# Patient Record
Sex: MALE | Race: WHITE | URBAN - METROPOLITAN AREA
[De-identification: names, ages, dates, MRNs, and addresses within clinical notes are randomized per-mention and may not be internally consistent; named-entity substitution may affect disease eponyms.]

---

## 2024-07-02 ENCOUNTER — OFFICE VISIT (OUTPATIENT)
Dept: INTERNAL MEDICINE CLINIC | Facility: CLINIC | Age: 39
End: 2024-07-02
Payer: COMMERCIAL

## 2024-07-02 VITALS
DIASTOLIC BLOOD PRESSURE: 88 MMHG | HEIGHT: 73 IN | SYSTOLIC BLOOD PRESSURE: 132 MMHG | OXYGEN SATURATION: 99 % | TEMPERATURE: 98 F | RESPIRATION RATE: 20 BRPM | HEART RATE: 62 BPM | BODY MASS INDEX: 26 KG/M2 | WEIGHT: 196.19 LBS

## 2024-07-02 DIAGNOSIS — Z00.00 ROUTINE GENERAL MEDICAL EXAMINATION AT A HEALTH CARE FACILITY: Primary | ICD-10-CM

## 2024-07-02 DIAGNOSIS — Z13.29 SCREENING FOR THYROID DISORDER: ICD-10-CM

## 2024-07-02 DIAGNOSIS — Z13.220 SCREENING FOR LIPID DISORDERS: ICD-10-CM

## 2024-07-02 DIAGNOSIS — R10.2 SUPRAPUBIC DISCOMFORT: ICD-10-CM

## 2024-07-02 DIAGNOSIS — Z13.0 SCREENING FOR BLOOD DISEASE: ICD-10-CM

## 2024-07-02 DIAGNOSIS — Z13.228 SCREENING FOR METABOLIC DISORDER: ICD-10-CM

## 2024-07-02 PROCEDURE — 99385 PREV VISIT NEW AGE 18-39: CPT | Performed by: INTERNAL MEDICINE

## 2024-07-02 PROCEDURE — 90471 IMMUNIZATION ADMIN: CPT | Performed by: INTERNAL MEDICINE

## 2024-07-02 PROCEDURE — 90715 TDAP VACCINE 7 YRS/> IM: CPT | Performed by: INTERNAL MEDICINE

## 2024-07-02 NOTE — PROGRESS NOTES
Nader Garcia  9/30/1985    Chief Complaint   Patient presents with    [facility]        HPI:   Nader Garcia is a 38 year old male who presents for an annual physical examination.    The patient is proactive with dietary habits and follows a vegan diet. He undergoes an exercise regimen on most mornings.     No chronic medical conditions.    No regular use of prescription or OTC medications.    No acute concerns.    No current outpatient medications on file.      Allergies   Allergen Reactions    Dander OTHER (SEE COMMENTS)     Cat    Itchy eyes  Runny nose      History reviewed. No pertinent past medical history.   There is no problem list on file for this patient.     History reviewed. No pertinent surgical history.   Family History   Problem Relation Age of Onset    Diabetes Paternal Grandmother       Social History     Socioeconomic History    Marital status:    Tobacco Use    Smoking status: Former     Types: Cigarettes    Smokeless tobacco: Current   Vaping Use    Vaping status: Former    Substances: Nicotine   Substance and Sexual Activity    Alcohol use: Yes    Drug use: Never         REVIEW OF SYSTEMS:   GENERAL: feels well otherwise  SKIN: no rashes  EYES:denies blurred vision or double vision  HEENT: not congested  LUNGS: denies shortness of breath with exertion  CARDIOVASCULAR: denies chest pain on exertion  GI: no nausea or abdominal pain  NEURO: denies headaches    EXAM:   /88   Pulse 62   Temp 97.5 °F (36.4 °C) (Skin)   Resp 20   Ht 6' 1\" (1.854 m)   Wt 196 lb 3.2 oz (89 kg)   SpO2 99%   BMI 25.89 kg/m²   GENERAL: Well developed, well nourished,in no apparent distress  SKIN: No rashes,no suspicious lesions  EYES: bilateral conjunctiva are clear  HEENT: atraumatic, normocephalic. TM WNL BL.  NECK: supple,no adenopathy,no bruits  LUNGS: clear to auscultation  CARDIO: RRR without murmur  GI: good BS's,no masses, HSM or tenderness    ASSESSMENT AND PLAN:   Nader Garcia is a  38 year old male who presents for an annual physical examination.    Outstanding screening and preventive measures:  Tetanus immunization: Tdap administered today    Intermittent suprapubic discomfort:  Currently asymptomatic  UA ordered    Routine/screening laboratory evaluation pending collection; recommendations to follow    The patient indicates understanding of these issues and agrees to the plan.  TODAY'S ORDERS     Orders Placed This Encounter   Procedures    TdaP (Adacel, Boostrix) [24635]       Meds & Refills:  Requested Prescriptions      No prescriptions requested or ordered in this encounter       Imaging & Consults:  TETANUS, DIPHTHERIA TOXOIDS AND ACELLULAR PERTUSIS VACCINE (TDAP), >7 YEARS, IM USE    No follow-ups on file.  There are no Patient Instructions on file for this visit.    All questions were answered and the patient agrees with the plan.     Thank you,  Peter Valdez MD

## 2024-07-04 LAB
ABSOLUTE BASOPHILS: 19 CELLS/UL (ref 0–200)
ABSOLUTE EOSINOPHILS: 202 CELLS/UL (ref 15–500)
ABSOLUTE LYMPHOCYTES: 1315 CELLS/UL (ref 850–3900)
ABSOLUTE MONOCYTES: 538 CELLS/UL (ref 200–950)
ABSOLUTE NEUTROPHILS: 2726 CELLS/UL (ref 1500–7800)
ALBUMIN/GLOBULIN RATIO: 1.7 (CALC) (ref 1–2.5)
ALBUMIN: 4.7 G/DL (ref 3.6–5.1)
ALKALINE PHOSPHATASE: 75 U/L (ref 36–130)
ALT: 20 U/L (ref 9–46)
AST: 21 U/L (ref 10–40)
BASOPHILS: 0.4 %
BILIRUBIN, TOTAL: 0.5 MG/DL (ref 0.2–1.2)
BUN: 7 MG/DL (ref 7–25)
CALCIUM: 9.8 MG/DL (ref 8.6–10.3)
CARBON DIOXIDE: 25 MMOL/L (ref 20–32)
CHLORIDE: 102 MMOL/L (ref 98–110)
CHOL/HDLC RATIO: 3.1 (CALC)
CHOLESTEROL, TOTAL: 167 MG/DL
CREATININE: 0.92 MG/DL (ref 0.6–1.26)
EGFR: 109 ML/MIN/1.73M2
EOSINOPHILS: 4.2 %
GLOBULIN: 2.7 G/DL (CALC) (ref 1.9–3.7)
GLUCOSE: 99 MG/DL (ref 65–99)
HDL CHOLESTEROL: 54 MG/DL
HEMATOCRIT: 46.8 % (ref 38.5–50)
HEMOGLOBIN: 15.3 G/DL (ref 13.2–17.1)
LDL-CHOLESTEROL: 97 MG/DL (CALC)
LYMPHOCYTES: 27.4 %
MCH: 30.5 PG (ref 27–33)
MCHC: 32.7 G/DL (ref 32–36)
MCV: 93.4 FL (ref 80–100)
MONOCYTES: 11.2 %
MPV: 10.9 FL (ref 7.5–12.5)
NEUTROPHILS: 56.8 %
NON-HDL CHOLESTEROL: 113 MG/DL (CALC)
PLATELET COUNT: 202 THOUSAND/UL (ref 140–400)
POTASSIUM: 4.8 MMOL/L (ref 3.5–5.3)
PROTEIN, TOTAL: 7.4 G/DL (ref 6.1–8.1)
RDW: 12.2 % (ref 11–15)
RED BLOOD CELL COUNT: 5.01 MILLION/UL (ref 4.2–5.8)
SODIUM: 137 MMOL/L (ref 135–146)
TRIGLYCERIDES: 71 MG/DL
TSH W/REFLEX TO FT4: 1.18 MIU/L (ref 0.4–4.5)
WHITE BLOOD CELL COUNT: 4.8 THOUSAND/UL (ref 3.8–10.8)